# Patient Record
Sex: MALE | Race: WHITE | NOT HISPANIC OR LATINO | Employment: OTHER | URBAN - METROPOLITAN AREA
[De-identification: names, ages, dates, MRNs, and addresses within clinical notes are randomized per-mention and may not be internally consistent; named-entity substitution may affect disease eponyms.]

---

## 2017-05-11 ENCOUNTER — TELEPHONE (OUTPATIENT)
Dept: NEUROLOGY | Facility: MEDICAL CENTER | Age: 57
End: 2017-05-11

## 2017-05-11 NOTE — TELEPHONE ENCOUNTER
Dr. Cano received the pt's blood work and his Triglycerides ar 2111 and the range is 0 - 149. Dr. Cano wants to know if he is taking the Fish oil 1000 mg TID, Tricor and the Pravachol. If he is taking the Fish oil 1000 mg TID he needs to increase it to 2000 mg TID. Called and spoke with pt's wife. All information/instructions were given. She verbally understood and will comply with all given. They will keep the appointment with Dr. Cano tomorrow. LALITA

## 2017-05-12 ENCOUNTER — OFFICE VISIT (OUTPATIENT)
Dept: NEUROLOGY | Facility: MEDICAL CENTER | Age: 57
End: 2017-05-12
Payer: MEDICARE

## 2017-05-12 VITALS
BODY MASS INDEX: 43.26 KG/M2 | SYSTOLIC BLOOD PRESSURE: 132 MMHG | HEIGHT: 71 IN | OXYGEN SATURATION: 93 % | RESPIRATION RATE: 16 BRPM | DIASTOLIC BLOOD PRESSURE: 76 MMHG | WEIGHT: 309 LBS | HEART RATE: 76 BPM | TEMPERATURE: 98.4 F

## 2017-05-12 DIAGNOSIS — E07.9 THYROID DISEASE: ICD-10-CM

## 2017-05-12 DIAGNOSIS — M79.2 POLYNEUROPATHIC PAIN: ICD-10-CM

## 2017-05-12 DIAGNOSIS — E78.2 MIXED HYPERLIPIDEMIA: ICD-10-CM

## 2017-05-12 PROCEDURE — G8419 CALC BMI OUT NRM PARAM NOF/U: HCPCS | Performed by: PSYCHIATRY & NEUROLOGY

## 2017-05-12 PROCEDURE — G8432 DEP SCR NOT DOC, RNG: HCPCS | Performed by: PSYCHIATRY & NEUROLOGY

## 2017-05-12 PROCEDURE — 3017F COLORECTAL CA SCREEN DOC REV: CPT | Mod: 8P | Performed by: PSYCHIATRY & NEUROLOGY

## 2017-05-12 PROCEDURE — 99214 OFFICE O/P EST MOD 30 MIN: CPT | Performed by: PSYCHIATRY & NEUROLOGY

## 2017-05-12 PROCEDURE — 1036F TOBACCO NON-USER: CPT | Performed by: PSYCHIATRY & NEUROLOGY

## 2017-05-12 RX ORDER — DILTIAZEM HYDROCHLORIDE 60 MG/1
TABLET, FILM COATED ORAL
COMMUNITY
Start: 2017-04-19

## 2017-05-12 ASSESSMENT — PATIENT HEALTH QUESTIONNAIRE - PHQ9: CLINICAL INTERPRETATION OF PHQ2 SCORE: 0

## 2017-05-12 NOTE — PROGRESS NOTES
NEUROLOGY NOTE    Referring Physician  Kelly Spence      CHIEF COMPLAINT:  Tingling on the feet and hands for 4 years  Chief Complaint   Patient presents with   • Follow-Up     Polyneuropathic pain       PRESENT ILLNESS:   Tingling on the feet and hands for 4 years    He denied alcohol, he did not have DM, he did not have neuropathy    Older brother -- 7 years older than the patient---also has neuropathy started having neuropathy since age of 20+ yO    Pain and tingling that made him not able to wear any normal shoes    He tried lyrica before but that did not help    He used to be an  before 2010--- he is not able to work since 2010-- possible stroke? then    PAST MEDICAL HISTORY:  Past Medical History   Diagnosis Date   • Hypertension        PAST SURGICAL HISTORY:  Past Surgical History   Procedure Laterality Date   • Other orthopedic surgery       L ring finger       FAMILY HISTORY:  History reviewed. No pertinent family history.    SOCIAL HISTORY:  Social History     Social History   • Marital Status:      Spouse Name: N/A   • Number of Children: N/A   • Years of Education: N/A     Occupational History   • Not on file.     Social History Main Topics   • Smoking status: Former Smoker -- 0.50 packs/day for 38 years   • Smokeless tobacco: Never Used   • Alcohol Use: 0.0 oz/week     0 Standard drinks or equivalent per week      Comment: very rare   • Drug Use: Yes     Special: Inhaled      Comment: marijuana - last time today   • Sexual Activity: Not on file     Other Topics Concern   • Not on file     Social History Narrative     ALLERGIES:  Allergies   Allergen Reactions   • Other Drug      The 'old tetanus' had,  A shot 2 yrs ago of the 'new'tetanus      TOBHX  History   Smoking status   • Former Smoker -- 0.50 packs/day for 38 years   Smokeless tobacco   • Never Used     ALCHX  History   Alcohol Use   • 0.0 oz/week   • 0 Standard drinks or equivalent per week     Comment: very rare      "    DRUGHX  History   Drug Use   • Yes   • Special: Inhaled     Comment: marijuana - last time today           MEDICATIONS:  Current Outpatient Prescriptions   Medication   • SYMBICORT 80-4.5 MCG/ACT Aerosol   • aspirin EC (ECOTRIN) 81 MG Tablet Delayed Response   • clopidogrel (PLAVIX) 75 MG Tab   • lisinopril (PRINIVIL, ZESTRIL) 40 MG tablet   • pravastatin (PRAVACHOL) 40 MG tablet   • amlodipine (NORVASC) 5 MG Tab   • Multiple Vitamin (MULTI VITAMIN DAILY PO)   • Cholecalciferol (VITAMIN D3) 5000 UNITS Tab   • buPROPion (WELLBUTRIN XL) 300 MG XL tablet   • Omega-3 Fatty Acids (FISH OIL) 1200 MG Cap   • furosemide (LASIX) 20 MG Tab   • carvedilol (COREG) 3.125 MG Tab   • fluticasone (FLONASE) 50 MCG/ACT nasal spray   • fenofibrate (TRICOR) 145 MG Tab   • Varenicline Tartrate (CHANTIX PO)     No current facility-administered medications for this visit.       REVIEW OF SYSTEM:    Constitutional: Denies fevers, Denies weight changes   Eyes: Denies changes in vision, no eye pain   Ears/Nose/Throat/Mouth: hearing loss  Cardiovascular: heart disease, CHF,   Respiratory: COPD  Gastrointestinal/Hepatic: Denies abdominal pain, nausea, vomiting, diarrhea, constipation or GI bleeding   Genitourinary: Denies bladder dysfunction, dysuria or frequency   Musculoskeletal/Rheum: Denies joint pain and swelling   Skin/Breast: Denies rash, denies breast lumps or discharge   Neurological: polyneuropathy for 4 years, stroke? In 2010 and not able to function  Psychiatric: denies mood disorder   Endocrine: denies hx of diabetes or thyroid dysfunction   Heme/Oncology/Lymph Nodes: Denies enlarged lymph nodes, denies brusing or known bleeding disorder   Allergic/Immunologic: Denies hx of allergies   Chronic use of Cannabis      PHYSICAL AND NEUROLOGICAL EXMAINATIONS:  VITAL SIGNS: /76 mmHg  Pulse 76  Temp(Src) 36.9 °C (98.4 °F)  Resp 16  Ht 1.803 m (5' 11\")  Wt 140.161 kg (309 lb)  BMI 43.12 kg/m2  SpO2 93%  CURRENT WEIGHT: "   BMI: Body mass index is 43.12 kg/(m^2).  PREVIOUS WEIGHTS:  Wt Readings from Last 25 Encounters:   05/12/17 140.161 kg (309 lb)   08/16/16 133.902 kg (295 lb 3.2 oz)   05/16/16 129.275 kg (285 lb)   05/02/11 102.059 kg (225 lb)       General appearance of patient: WDWN(+) NAD(+)    EYES  o Fundus : Papilledem(-) Exudates(-) Hemorrhage(-)  Nervous System  Orientation to time, place and person(+)  Memory normal(+)  Language: aphasia(-)  Knowledge: past(+) Current(+)  Attention(+)  Cranial Nerves  • Nerve 2: intact  • Nerve 3,4,6: intact  • Nerve 5 : intact  • Nerve 7: intact  • Nerve 8: hearing loss  • Nerve 9 & 10: intact  • Nerve 11: intact  • Nerve 12: intact  Muscle Power and muscle tone: symmetric, normal in upper and lower  Sensory System: Pin sensation intact(+)  Reflexes: symmetric throughout  Cerebellar Function FNP normal   Gait : Steady(-) TandemGait -- not able to perform  Heart and Vascular  Peripheral Vasucular system : Edema (-) Swelling(-)  RHB, Breathing sound clear  abdomen bowel sound normoactive  Extremities freely moveable  Joints no contracture     Baseline---tingling up to the knees  Tingling up the thigh-- L>R   Both hands tingling-- 4 years  Did not fall since last visit  Could walk 2 blocks    Patient is seeing the following doctors    Cardiologist-- heart failure  Pulmonologist-- sleep apnea  PCP  Neurologist-- us        IMPRESSION:    1. Polyneuropathy for 4 years-- small fiber? Pain and tingling ( lyrica tried but failed to help) affecting both feet and hands  2. Chronic use of Cannabis , Hx of Cocaine and Amphetamine in the past  3. Episodes of left body tingling and weakness 2010 and made him unable to function-- the last episode 2012  4. Hx of CAD, CHF and COPD  5. Hypertriglyemia? Off the chart--High Triglyceride could cause neuropathy-- patient promised to give us the labs -- see reference    PLAN/RECOMMENDATIONS:       High Triglyceride could cause neuropathy-- patient promised to  give us the labs -- see reference    ________________________________________________________________________    Advise exercise muscle and exercise brain  Avoid processed foods-- focus on natural foods  Avoid sugar  Weight Loss  Fasting for 12 hours every other day  Quit smoking   ________________________________________________________________________    Fish Oil -- Omega 3 2000mg 3# daily  Try Daily Probiotic too  Vit D-3 2000 unit daily  ________________________________________________________________________    ________________________________________________________________________      ________________________________________________________________________    Foods that inflame    Try to avoid or limit these foods as much as possible:     refined carbohydrates, such as white bread and pastries     French fries and other fried foods     soda and other sugar-sweetened beverages     red meat (burgers, steaks) and processed meat (hot dogs, sausage)     margarine, shortening, and lard    Foods that combat inflammation     Include plenty of these anti-inflammatory foods in your diet:     tomatoes     olive oil     green leafy vegetables, such as spinach, kale, and collards     nuts like almonds and walnuts     fatty fish like salmon, mackerel, tuna, and sardines     fruits such as strawberries, blueberries, cherries, and oranges        ________________________________________________________________________    PLAN        SIGNATURE:  Katie-Linda Cano    ________________________________________________________________________      Hypertriglyceridemia and peripheral neuropathy might be related-- please see the following reference    PREFERENCE whether to add one more medicine to control Hypertriglyceridemia    Combination therapy raises safety concerns. All statins (especially at higher doses) increase the risk of rhabdomyolysis; this risk may be compounded by fibrate use. Cerivastatin (Baycol) was withdrawn from the  market because of reports of fatal rhabdomyolysis, often in patients also taking gem-fibrozil (Lopid). An increased risk also has been shown with rosuvastatin (Crestor).18,19 When combined with statins, gemfibrozil may increase serum statin levels by inhibiting statin metabolism.    Compared with gemfibrozil/statin therapy, fenofibrate/statin therapy has a lower incidence and reported rate of rhabdomyolysis and may be safer.20,21 However, long-term safety and outcome data for fibrate/statin combinations are lacking, and combination therapy should be used with caution. Patients should receive the lowest possible statin dosage, be monitored closely for side effects (e.g., muscle pain, brown urine), and be given the opportunity for proper informed consent.    Neuro Endocrinol Danuta. 2005 Dec;26(6):775-9. Hypertriglyceridemia and peripheral neuropathy in neurologically asymptomatic patients.  Ilir HS1, Dom ST, Amrit MS, Brandin RA.  ________________________________________________________________________        CC:  Kelly Spence M.D.

## 2017-05-12 NOTE — PATIENT INSTRUCTIONS
IMPRESSION:    1. Polyneuropathy for 4 years-- small fiber? Pain and tingling ( lyrica tried but failed to help) affecting both feet and hands  2. Chronic use of Cannabis , Hx of Cocaine and Amphetamine in the past  3. Episodes of left body tingling and weakness 2010 and made him unable to function-- the last episode 2012  4. Hx of CAD, CHF and COPD  5. Hypertriglyemia? Off the chart--High Triglyceride could cause neuropathy-- patient promised to give us the labs -- see reference    PLAN/RECOMMENDATIONS:       High Triglyceride could cause neuropathy-- patient promised to give us the labs -- see reference    ________________________________________________________________________    Advise exercise muscle and exercise brain  Avoid processed foods-- focus on natural foods  Avoid sugar  Weight Loss  Fasting for 12 hours every other day  Quit smoking   ________________________________________________________________________    Fish Oil -- Omega 3 2000mg 3# daily  Try Daily Probiotic too  Vit D-3 2000 unit daily  ________________________________________________________________________    ________________________________________________________________________      ________________________________________________________________________    Foods that inflame    Try to avoid or limit these foods as much as possible:     refined carbohydrates, such as white bread and pastries     French fries and other fried foods     soda and other sugar-sweetened beverages     red meat (burgers, steaks) and processed meat (hot dogs, sausage)     margarine, shortening, and lard    Foods that combat inflammation     Include plenty of these anti-inflammatory foods in your diet:     tomatoes     olive oil     green leafy vegetables, such as spinach, kale, and collards     nuts like almonds and walnuts     fatty fish like salmon, mackerel, tuna, and sardines     fruits such as strawberries, blueberries, cherries, and  oranges        ________________________________________________________________________    PLAN        SIGNATURE:  Lashae Cano    ________________________________________________________________________      Hypertriglyceridemia and peripheral neuropathy might be related-- please see the following reference    PREFERENCE whether to add one more medicine to control Hypertriglyceridemia    Combination therapy raises safety concerns. All statins (especially at higher doses) increase the risk of rhabdomyolysis; this risk may be compounded by fibrate use. Cerivastatin (Baycol) was withdrawn from the market because of reports of fatal rhabdomyolysis, often in patients also taking gem-fibrozil (Lopid). An increased risk also has been shown with rosuvastatin (Crestor).18,19 When combined with statins, gemfibrozil may increase serum statin levels by inhibiting statin metabolism.    Compared with gemfibrozil/statin therapy, fenofibrate/statin therapy has a lower incidence and reported rate of rhabdomyolysis and may be safer.20,21 However, long-term safety and outcome data for fibrate/statin combinations are lacking, and combination therapy should be used with caution. Patients should receive the lowest possible statin dosage, be monitored closely for side effects (e.g., muscle pain, brown urine), and be given the opportunity for proper informed consent.    Neuro Endocrinol Danuta. 2005 Dec;26(6):775-9. Hypertriglyceridemia and peripheral neuropathy in neurologically asymptomatic patients.  Ilir HS1, Dom ST, Amrit MS, Brandin RA.  ________________________________________________________________________        CC:  Kelly Spence M.D.

## 2017-05-12 NOTE — MR AVS SNAPSHOT
"        Nikolas Bang   2017 1:20 PM   Office Visit   MRN: [id number]    Department:  [facility]   Dept Phone:  [phone]    Description:  Male : 1960   Provider:  Lashae Cano M.D.           Reason for Visit     Follow-Up Polyneuropathic pain      Allergies as of 2017     Allergen Noted Reactions    Other Drug 2010       The 'old tetanus' had,  A shot 2 yrs ago of the 'new'tetanus       You were diagnosed with     Polyneuropathic pain   [085787]       Thyroid disease   [550105]       Mixed hyperlipidemia   [272.2.ICD-9-CM]         Vital Signs     Blood Pressure Pulse Temperature Respirations Height Weight    132/76 mmHg 76 36.9 °C (98.4 °F) 16 1.803 m (5' 11\") 140.161 kg (309 lb)    Body Mass Index Oxygen Saturation Smoking Status             43.12 kg/m2 93% Former Smoker         Basic Information     Date Of Birth Sex Race Ethnicity Preferred Language    1960 Male White Non- English      Your appointments     Aug 31, 2017  9:40 AM   Follow Up Visit with Lashae Cano M.D.   [facility] (--)    [address] [place] [address], [address]  [facility] [address] [phone]           You will be receiving a confirmation call a few days before your appointment from our automated call confirmation system.              Problem List              ICD-10-CM Priority Class Noted - Resolved    Polyneuropathic pain M79.2   2016 - Present    Thyroid disease E07.9   2016 - Present    Hyperlipidemia E78.5   2016 - Present      Health Maintenance        Date Due Completion Dates    IMM DTaP/Tdap/Td Vaccine (1 - Tdap) 1980 ---    COLONOSCOPY 2011 ---            Current Immunizations     No immunizations on file.      Below and/or attached are the medications your provider expects you to take. Review all of your home medications and newly ordered medications with your provider and/or pharmacist. Follow medication instructions as directed by your provider " and/or pharmacist. Please keep your medication list with you and share with your provider. Update the information when medications are discontinued, doses are changed, or new medications (including over-the-counter products) are added; and carry medication information at all times in the event of emergency situations     Allergies:  OTHER DRUG - (reactions not documented)               Medications  Valid as of: May 12, 2017 -  1:35 PM    Generic Name Brand Name Tablet Size Instructions for use    AmLODIPine Besylate (Tab) NORVASC 5 MG Take 5 mg by mouth every day.        Aspirin (Tablet Delayed Response) ECOTRIN 81 MG Take 81 mg by mouth every day.        Budesonide-Formoterol Fumarate (Aerosol) SYMBICORT 80-4.5 MCG/ACT         BuPROPion HCl (TABLET SR 24 HR) WELLBUTRIN  MG Take 300 mg by mouth every morning.        Carvedilol (Tab) COREG 3.125 MG Take 3.125 mg by mouth 2 times a day, with meals.        Cholecalciferol (Tab) Vitamin D3 5000 UNITS Take  by mouth.        Clopidogrel Bisulfate (Tab) PLAVIX 75 MG Take 75 mg by mouth every day.        Fenofibrate (Tab) TRICOR 145 MG Take 145 mg by mouth every day.        Fluticasone Propionate (Suspension) FLONASE 50 MCG/ACT Spray 1 Spray in nose every day.        Furosemide (Tab) LASIX 20 MG Take 20 mg by mouth 2 times a day.        Lisinopril (Tab) PRINIVIL, ZESTRIL 40 MG Take 40 mg by mouth every day.        Multiple Vitamin   Take  by mouth.        Omega-3 Fatty Acids (Cap) Fish Oil 1200 MG Take  by mouth.        Pravastatin Sodium (Tab) PRAVACHOL 40 MG Take 40 mg by mouth every evening.        Varenicline Tartrate   Take  by mouth.        .                 Medicines prescribed today were sent to:     [facility] [address] [place], [place] [address] [place] [address]    [address] [address]    Phone: [phone] Fax: [phone]    Open 24 Hours?: No      Medication refill instructions:       If your prescription bottle indicates you  have medication refills left, it is not necessary to call your provider’s office. Please contact your pharmacy and they will refill your medication.    If your prescription bottle indicates you do not have any refills left, you may request refills at any time through one of the following ways: The online [employer] system (except Urgent Care), by calling your provider’s office, or by asking your pharmacy to contact your provider’s office with a refill request. Medication refills are processed only during regular business hours and may not be available until the next business day. Your provider may request additional information or to have a follow-up visit with you prior to refilling your medication.   *Please Note: Medication refills are assigned a new Rx number when refilled electronically. Your pharmacy may indicate that no refills were authorized even though a new prescription for the same medication is available at the pharmacy. Please request the medicine by name with the pharmacy before contacting your provider for a refill.        Referral     A referral request has been sent to our patient care coordination department. Please allow 3-5 business days for us to process this request and contact you either by phone or mail. If you do not hear from us by the 5th business day, please call us at [phone].        Instructions        IMPRESSION:    1. Polyneuropathy for 4 years-- small fiber? Pain and tingling ( lyrica tried but failed to help) affecting both feet and hands  2. Chronic use of Cannabis , Hx of Cocaine and Amphetamine in the past  3. Episodes of left body tingling and weakness 2010 and made him unable to function-- the last episode 2012  4. Hx of CAD, CHF and COPD  5. Hypertriglyemia? Off the chart--High Triglyceride could cause neuropathy-- patient promised to give us the labs -- see reference    PLAN/RECOMMENDATIONS:       High Triglyceride could cause neuropathy-- patient promised to give us the  labs -- see reference    ________________________________________________________________________    Advise exercise muscle and exercise brain  Avoid processed foods-- focus on natural foods  Avoid sugar  Weight Loss  Fasting for 12 hours every other day  Quit smoking   ________________________________________________________________________    Fish Oil -- Omega 3 2000mg 3# daily  Try Daily Probiotic too  Vit D-3 2000 unit daily  ________________________________________________________________________    ________________________________________________________________________      ________________________________________________________________________    Foods that inflame    Try to avoid or limit these foods as much as possible:     refined carbohydrates, such as white bread and pastries     French fries and other fried foods     soda and other sugar-sweetened beverages     red meat (burgers, steaks) and processed meat (hot dogs, sausage)     margarine, shortening, and lard    Foods that combat inflammation     Include plenty of these anti-inflammatory foods in your diet:     tomatoes     olive oil     green leafy vegetables, such as spinach, kale, and collards     nuts like almonds and walnuts     fatty fish like salmon, mackerel, tuna, and sardines     fruits such as strawberries, blueberries, cherries, and oranges        ________________________________________________________________________    PLAN        SIGNATURE:  Katie-Linda Cano    ________________________________________________________________________      Hypertriglyceridemia and peripheral neuropathy might be related-- please see the following reference    PREFERENCE whether to add one more medicine to control Hypertriglyceridemia    Combination therapy raises safety concerns. All statins (especially at higher doses) increase the risk of rhabdomyolysis; this risk may be compounded by fibrate use. Cerivastatin (Baycol) was withdrawn from the market because  of reports of fatal rhabdomyolysis, often in patients also taking gem-fibrozil (Lopid). An increased risk also has been shown with rosuvastatin (Crestor).18,19 When combined with statins, gemfibrozil may increase serum statin levels by inhibiting statin metabolism.    Compared with gemfibrozil/statin therapy, fenofibrate/statin therapy has a lower incidence and reported rate of rhabdomyolysis and may be safer.20,21 However, long-term safety and outcome data for fibrate/statin combinations are lacking, and combination therapy should be used with caution. Patients should receive the lowest possible statin dosage, be monitored closely for side effects (e.g., muscle pain, brown urine), and be given the opportunity for proper informed consent.    Neuro Endocrinol Danuta. 2005 Dec;26(6):775-9. Hypertriglyceridemia and peripheral neuropathy in neurologically asymptomatic patients.  Ilir HS1, Dom ST, Amrit MS, Brandin RA.  ________________________________________________________________________        CC:  Kelly Spence M.D.              [employer] Access Code: Activation code not generated  Current [employer] Status: Active

## 2017-05-15 ENCOUNTER — TELEPHONE (OUTPATIENT)
Dept: NEUROLOGY | Facility: MEDICAL CENTER | Age: 57
End: 2017-05-15

## 2017-05-15 NOTE — TELEPHONE ENCOUNTER
Pt's wife is calling and has questions about the blood work, Called and LM for pt's wife with the answers she needed. KA

## 2017-06-20 LAB
CHOLEST SERPL-MCNC: 427 MG/DL (ref 100–199)
COMMENT 011824: ABNORMAL
HDLC SERPL-MCNC: 18 MG/DL
LDLC SERPL CALC-MCNC: ABNORMAL MG/DL (ref 0–99)
TRIGL SERPL-MCNC: 2507 MG/DL (ref 0–149)
VLDLC SERPL CALC-MCNC: ABNORMAL MG/DL (ref 5–40)

## 2017-06-30 ENCOUNTER — TELEPHONE (OUTPATIENT)
Dept: NEUROLOGY | Facility: MEDICAL CENTER | Age: 57
End: 2017-06-30

## 2017-06-30 NOTE — TELEPHONE ENCOUNTER
----- Message from Lashae Cano M.D. sent at 6/20/2017  2:39 PM PDT -----  With high triglyceride    Does the patient take omega 3( fish oil)  If not  Remind him      ________________________________________________________________________    Fish Oil -- Omega 3 1000mg 3# two times per day  ________________________________________________________________________             ----- Message -----     From: Keith Metzger     Sent: 6/20/2017  10:06 AM       To: Lashae Cano M.D.

## 2017-08-31 ENCOUNTER — APPOINTMENT (OUTPATIENT)
Dept: NEUROLOGY | Facility: MEDICAL CENTER | Age: 57
End: 2017-08-31
Payer: MEDICARE

## 2017-09-29 ENCOUNTER — OFFICE VISIT (OUTPATIENT)
Dept: NEUROLOGY | Facility: MEDICAL CENTER | Age: 57
End: 2017-09-29
Payer: MEDICARE

## 2017-09-29 VITALS
WEIGHT: 308 LBS | DIASTOLIC BLOOD PRESSURE: 80 MMHG | RESPIRATION RATE: 16 BRPM | HEIGHT: 71 IN | OXYGEN SATURATION: 93 % | BODY MASS INDEX: 43.12 KG/M2 | TEMPERATURE: 98.3 F | HEART RATE: 76 BPM | SYSTOLIC BLOOD PRESSURE: 132 MMHG

## 2017-09-29 DIAGNOSIS — M79.2 POLYNEUROPATHIC PAIN: ICD-10-CM

## 2017-09-29 DIAGNOSIS — E07.9 THYROID DISEASE: ICD-10-CM

## 2017-09-29 DIAGNOSIS — E78.2 MIXED HYPERLIPIDEMIA: ICD-10-CM

## 2017-09-29 PROCEDURE — 99214 OFFICE O/P EST MOD 30 MIN: CPT | Performed by: PSYCHIATRY & NEUROLOGY

## 2017-09-29 RX ORDER — CYCLOBENZAPRINE HCL 10 MG
TABLET ORAL
COMMUNITY
Start: 2017-09-21

## 2017-09-29 RX ORDER — PREDNISONE 10 MG/1
TABLET ORAL
COMMUNITY
Start: 2017-09-21 | End: 2018-01-23

## 2017-09-29 RX ORDER — AMOXICILLIN 500 MG
3600 CAPSULE ORAL 2 TIMES DAILY
Qty: 180 CAP | Refills: 4 | Status: SHIPPED | OUTPATIENT
Start: 2017-09-29

## 2017-09-29 RX ORDER — HYDROCODONE BITARTRATE AND ACETAMINOPHEN 10; 325 MG/1; MG/1
TABLET ORAL
COMMUNITY
Start: 2017-09-21

## 2017-09-29 NOTE — PROGRESS NOTES
NEUROLOGY NOTE    Referring Physician  Kelly Spence      CHIEF COMPLAINT:  Tingling on the feet and hands for 4 years  Chief Complaint   Patient presents with   • Follow-Up     Polyneuropathic pain       PRESENT ILLNESS:   Tingling on the feet and hands for 4 years    He denied alcohol, he did not have DM, he did not have neuropathy    Older brother -- 7 years older than the patient---also has neuropathy started having neuropathy since age of 20+ yO    Pain and tingling that made him not able to wear any normal shoes    He tried lyrica before but that did not help    He used to be an  before 2010--- he is not able to work since 2010-- possible stroke? then    PAST MEDICAL HISTORY:  Past Medical History:   Diagnosis Date   • Hypertension        PAST SURGICAL HISTORY:  Past Surgical History:   Procedure Laterality Date   • OTHER ORTHOPEDIC SURGERY      L ring finger       FAMILY HISTORY:  No family history on file.    SOCIAL HISTORY:  Social History     Social History   • Marital status:      Spouse name: N/A   • Number of children: N/A   • Years of education: N/A     Occupational History   • Not on file.     Social History Main Topics   • Smoking status: Former Smoker     Packs/day: 0.50     Years: 38.00   • Smokeless tobacco: Never Used   • Alcohol use 0.0 oz/week      Comment: very rare   • Drug use:      Types: Inhaled, Marijuana      Comment: marijuana - last time today   • Sexual activity: Not on file     Other Topics Concern   • Not on file     Social History Narrative   • No narrative on file     ALLERGIES:  Allergies   Allergen Reactions   • Other Drug      The 'old tetanus' had,  A shot 2 yrs ago of the 'new'tetanus      TOBHX  History   Smoking Status   • Former Smoker   • Packs/day: 0.50   • Years: 38.00   Smokeless Tobacco   • Never Used     ALCHX  History   Alcohol Use   • 0.0 oz/week     Comment: very rare     DRUGHX  History   Drug Use   • Types: Inhaled, Marijuana     Comment:  "marijuana - last time today           MEDICATIONS:  Current Outpatient Prescriptions   Medication   • cyclobenzaprine (FLEXERIL) 10 MG Tab   • predniSONE (DELTASONE) 10 MG Tab   • SYMBICORT 80-4.5 MCG/ACT Aerosol   • aspirin EC (ECOTRIN) 81 MG Tablet Delayed Response   • clopidogrel (PLAVIX) 75 MG Tab   • lisinopril (PRINIVIL, ZESTRIL) 40 MG tablet   • pravastatin (PRAVACHOL) 40 MG tablet   • amlodipine (NORVASC) 5 MG Tab   • Multiple Vitamin (MULTI VITAMIN DAILY PO)   • Cholecalciferol (VITAMIN D3) 5000 UNITS Tab   • buPROPion (WELLBUTRIN XL) 300 MG XL tablet   • fluticasone (FLONASE) 50 MCG/ACT nasal spray   • Omega-3 Fatty Acids (FISH OIL) 1200 MG Cap   • carvedilol (COREG) 3.125 MG Tab   • hydrocodone/acetaminophen (NORCO)  MG Tab   • fenofibrate (TRICOR) 145 MG Tab   • Varenicline Tartrate (CHANTIX PO)   • furosemide (LASIX) 20 MG Tab     No current facility-administered medications for this visit.        REVIEW OF SYSTEM:    Constitutional: Denies fevers, Denies weight changes   Eyes: Denies changes in vision, no eye pain   Ears/Nose/Throat/Mouth: hearing loss  Cardiovascular: heart disease, CHF,   Respiratory: COPD  Gastrointestinal/Hepatic: Denies abdominal pain, nausea, vomiting, diarrhea, constipation or GI bleeding   Genitourinary: Denies bladder dysfunction, dysuria or frequency   Musculoskeletal/Rheum: Denies joint pain and swelling   Skin/Breast: Denies rash, denies breast lumps or discharge   Neurological: polyneuropathy for 4 years, stroke? In 2010 and not able to function  Psychiatric: denies mood disorder   Endocrine: denies hx of diabetes or thyroid dysfunction   Heme/Oncology/Lymph Nodes: Denies enlarged lymph nodes, denies brusing or known bleeding disorder   Allergic/Immunologic: Denies hx of allergies   Chronic use of Cannabis      PHYSICAL AND NEUROLOGICAL EXMAINATIONS:  VITAL SIGNS: /80   Pulse 76   Temp 36.8 °C (98.3 °F)   Resp 16   Ht 1.803 m (5' 11\")   Wt (!) 139.7 kg " (308 lb)   SpO2 93%   BMI 42.96 kg/m²   CURRENT WEIGHT:   BMI: Body mass index is 42.96 kg/m².  PREVIOUS WEIGHTS:  Wt Readings from Last 25 Encounters:   09/29/17 (!) 139.7 kg (308 lb)   05/12/17 (!) 140.2 kg (309 lb)   08/16/16 (!) 133.9 kg (295 lb 3.2 oz)   05/16/16 (!) 129.3 kg (285 lb)   05/02/11 102.1 kg (225 lb)       General appearance of patient: WDWN(+) NAD(+)    EYES  o Fundus : Papilledem(-) Exudates(-) Hemorrhage(-)  Nervous System  Orientation to time, place and person(+)  Memory normal(+)  Language: aphasia(-)  Knowledge: past(+) Current(+)  Attention(+)  Cranial Nerves  • Nerve 2: intact  • Nerve 3,4,6: intact  • Nerve 5 : intact  • Nerve 7: intact  • Nerve 8: hearing loss  • Nerve 9 & 10: intact  • Nerve 11: intact  • Nerve 12: intact  Muscle Power and muscle tone: symmetric, normal in upper and lower  Sensory System: Pin sensation intact(+)  Reflexes: symmetric throughout  Cerebellar Function FNP normal   Gait : Steady(-) TandemGait -- not able to perform  Heart and Vascular  Peripheral Vasucular system : Edema (-) Swelling(-)  RHB, Breathing sound clear  abdomen bowel sound normoactive  Extremities freely moveable  Joints no contracture     Baseline---tingling up to the knees  Tingling up the thigh-- L>R   Both hands tingling-- 4 years  Did not fall since last visit  Could walk 2 blocks    Patient is seeing the following doctors    Cardiologist-- heart failure  Pulmonologist-- sleep apnea  PCP  Neurologist-- us    Younger brother -- 6 years younger --got TIA   Older sister --   Old brother-- polyneuropathy-- high TG    IMPRESSION:    1. Polyneuropathy for 4 years-- small fiber? Pain and tingling ( lyrica tried but failed to help) affecting both feet and hands  2. Chronic use of Cannabis , Hx of Cocaine and Amphetamine in the past  3. Episodes of left body tingling and weakness 2010 and made him unable to function-- the last episode 2012  4. Hx of CAD, CHF and COPD  5. Familiar Hypertriglyemia?  Off the chart--High Triglyceride could cause neuropathy-- patient promised to give us the labs -- see reference    PLAN/RECOMMENDATIONS:           ________________________________________________________________________    Advise exercise muscle and exercise brain  Avoid processed foods-- focus on natural foods  Avoid sugar  Weight Loss  Fasting for 12 hours every other day  Quit smoking   ________________________________________________________________________    Fish Oil -- Omega 3 2000mg 3# daily ( the patient probably is only taking 900mg bid?  Try Daily Probiotic too  Vit D-3 4000 unit daily  ________________________________________________________________________    ________________________________________________________________________    Results for DOAN JESSE CARRIZALES (MRN [id number]) as of 9/29/2017 10:14   Ref. Range 5/9/2017 12:52 6/19/2017 08:49   Cholesterol,Tot Latest Ref Range: 100 - 199 mg/dL 434 (H) 427 (H)   Triglycerides Latest Ref Range: 0 - 149 mg/dL 2,111 (HH) 2,507 (HH)   HDL Latest Ref Range: >39 mg/dL 22 (L) 18 (L)     ________________________________________________________________________    Foods that inflame    Try to avoid or limit these foods as much as possible:     refined carbohydrates, such as white bread and pastries     French fries and other fried foods     soda and other sugar-sweetened beverages     red meat (burgers, steaks) and processed meat (hot dogs, sausage)     margarine, shortening, and lard    Foods that combat inflammation     Include plenty of these anti-inflammatory foods in your diet:     tomatoes     olive oil     green leafy vegetables, such as spinach, kale, and collards     nuts like almonds and walnuts     fatty fish like salmon, mackerel, tuna, and sardines     fruits such as strawberries, blueberries, cherries, and oranges        ________________________________________________________________________    PLAN        SIGNATURE:  Lashae  Jerome    ________________________________________________________________________      Hypertriglyceridemia and peripheral neuropathy might be related-- please see the following reference    PREFERENCE whether to add one more medicine to control Hypertriglyceridemia    Combination therapy raises safety concerns. All statins (especially at higher doses) increase the risk of rhabdomyolysis; this risk may be compounded by fibrate use. Cerivastatin (Baycol) was withdrawn from the market because of reports of fatal rhabdomyolysis, often in patients also taking gem-fibrozil (Lopid). An increased risk also has been shown with rosuvastatin (Crestor).18,19 When combined with statins, gemfibrozil may increase serum statin levels by inhibiting statin metabolism.    Compared with gemfibrozil/statin therapy, fenofibrate/statin therapy has a lower incidence and reported rate of rhabdomyolysis and may be safer.20,21 However, long-term safety and outcome data for fibrate/statin combinations are lacking, and combination therapy should be used with caution. Patients should receive the lowest possible statin dosage, be monitored closely for side effects (e.g., muscle pain, brown urine), and be given the opportunity for proper informed consent.    Neuro Endocrinol Dan[place]. 2005 Dec;26(6):775-9. Hypertriglyceridemia and peripheral neuropathy in neurologically asymptomatic patients.  Ilir HS1, Dom ST, Amrit MS, Brandin RA.  ________________________________________________________________________        CC:  Kelly Spence M.D.

## 2017-10-02 ENCOUNTER — TELEPHONE (OUTPATIENT)
Dept: NEUROLOGY | Facility: MEDICAL CENTER | Age: 57
End: 2017-10-02

## 2017-10-02 NOTE — TELEPHONE ENCOUNTER
Pharmacy called to change Fish Oil -- Omega 3 2000mg 3# daily ( the patient probably is only taking 900mg bid?    Insurance won't pay for 2000 #3 daily. They will pay for 1000 mg tabs to equal 6000 mg generic, they will give that amount to equal prescribed amount.

## 2017-10-09 ENCOUNTER — TELEPHONE (OUTPATIENT)
Dept: NEUROLOGY | Facility: MEDICAL CENTER | Age: 57
End: 2017-10-09

## 2017-10-19 ENCOUNTER — TELEPHONE (OUTPATIENT)
Dept: NEUROLOGY | Facility: MEDICAL CENTER | Age: 57
End: 2017-10-19

## 2017-10-19 NOTE — TELEPHONE ENCOUNTER
Pharmacy called insurance will only approve 4 caps a day bid of Lovaza 1 gm caps. Script changed patient notified

## 2017-10-25 ENCOUNTER — TELEPHONE (OUTPATIENT)
Dept: NEUROLOGY | Facility: MEDICAL CENTER | Age: 57
End: 2017-10-25

## 2018-01-19 ENCOUNTER — TELEPHONE (OUTPATIENT)
Dept: NEUROLOGY | Facility: MEDICAL CENTER | Age: 58
End: 2018-01-19

## 2018-01-19 LAB
CHOLEST SERPL-MCNC: 354 MG/DL (ref 100–199)
COMMENT 011824: ABNORMAL
HDLC SERPL-MCNC: 15 MG/DL
LDLC SERPL CALC-MCNC: ABNORMAL MG/DL (ref 0–99)
TRIGL SERPL-MCNC: 1948 MG/DL (ref 0–149)
VLDLC SERPL CALC-MCNC: ABNORMAL MG/DL (ref 5–40)

## 2018-01-20 NOTE — TELEPHONE ENCOUNTER
Results for JESSE DOAN (MRN [id number]) as of 1/19/2018 16:09   Ref. Range 5/9/2017 12:52 6/19/2017 08:49 1/18/2018 07:42   Cholesterol,Tot Latest Ref Range: 100 - 199 mg/dL 434 (H) 427 (H) 354 (H)   Triglycerides Latest Ref Range: 0 - 149 mg/dL 2,111 (HH) 2,507 (HH) 1,948 (HH)   HDL Latest Ref Range: >39 mg/dL 22 (L) 18 (L) 15 (L)     Please remind him to try  Fish Oil -- Omega 3 2000mg 3# daily

## 2018-01-23 ENCOUNTER — OFFICE VISIT (OUTPATIENT)
Dept: NEUROLOGY | Facility: MEDICAL CENTER | Age: 58
End: 2018-01-23
Payer: MEDICARE

## 2018-01-23 VITALS
TEMPERATURE: 98.7 F | RESPIRATION RATE: 18 BRPM | HEIGHT: 71 IN | OXYGEN SATURATION: 94 % | WEIGHT: 287.7 LBS | BODY MASS INDEX: 40.28 KG/M2 | SYSTOLIC BLOOD PRESSURE: 128 MMHG | DIASTOLIC BLOOD PRESSURE: 70 MMHG | HEART RATE: 68 BPM

## 2018-01-23 DIAGNOSIS — I63.549 CEREBROVASCULAR ACCIDENT (CVA) DUE TO STENOSIS OF CEREBELLAR ARTERY, UNSPECIFIED BLOOD VESSEL LATERALITY (HCC): ICD-10-CM

## 2018-01-23 DIAGNOSIS — M79.2 POLYNEUROPATHIC PAIN: ICD-10-CM

## 2018-01-23 DIAGNOSIS — G40.909 SEIZURE CEREBRAL (HCC): ICD-10-CM

## 2018-01-23 PROBLEM — I63.9 STROKE (HCC): Status: ACTIVE | Noted: 2018-01-23

## 2018-01-23 PROCEDURE — 99214 OFFICE O/P EST MOD 30 MIN: CPT | Performed by: PSYCHIATRY & NEUROLOGY

## 2018-01-23 NOTE — PATIENT INSTRUCTIONS
Younger brother -- 6 years younger --got TIA   Older sister --   Old brother-- polyneuropathy-- high TG    IMPRESSION:    1. Polyneuropathy for 4 years-- small fiber? Pain and tingling ( lyrica tried but failed to help) affecting both feet and hands--- continue to worsen  2. Chronic use of Cannabis , Hx of Cocaine and Amphetamine in the past  3. Episodes of left body tingling, shaking and weakness 2010 and made him unable to function-- the last episode 2012  4. Hx of CAD status post stent, CHF and COPD  5. Familiar Hypertriglyemia? Off the chart--High Triglyceride could cause neuropathy-- patient promised to give us the labs -- see reference  6. Episodes of left body tingling, shaking and weakness worsened since last visit-- small stroke or seizure    PLAN/RECOMMENDATIONS:     Seeing cardiologist  On the way of losing weight, denied alcohol, smoking, did use weeds  Will get MRI of brain, Carotid Duplex, EEG      ________________________________________________________________________    Advise exercise muscle and exercise brain  Avoid processed foods-- focus on natural foods  Avoid sugar  Weight Loss  Fasting for 12 hours every other day  Quit smoking   ________________________________________________________________________    Fish Oil -- Omega 3 3000mg 3# daily-- total 9000mg omega 3 daily ( the patient probably is only taking around 2000mg ?  Try Daily Probiotic too  Vit D-3 4000 unit daily  ________________________________________________________________________        ________________________________________________________________________    Foods that inflame    Try to avoid or limit these foods as much as possible:     refined carbohydrates, such as white bread and pastries     French fries and other fried foods     soda and other sugar-sweetened beverages     red meat (burgers, steaks) and processed meat (hot dogs, sausage)     margarine, shortening, and lard    Foods that combat inflammation     Include  plenty of these anti-inflammatory foods in your diet:     tomatoes     olive oil     green leafy vegetables, such as spinach, kale, and collards     nuts like almonds and walnuts     fatty fish like salmon, mackerel, tuna, and sardines     fruits such as strawberries, blueberries, cherries, and oranges        ________________________________________________________________________    PLAN        SIGNATURE:  Lashae Jerome    ________________________________________________________________________      Hypertriglyceridemia and peripheral neuropathy might be related-- please see the following reference    PREFERENCE whether to add one more medicine to control Hypertriglyceridemia    Combination therapy raises safety concerns. All statins (especially at higher doses) increase the risk of rhabdomyolysis; this risk may be compounded by fibrate use. Cerivastatin (Baycol) was withdrawn from the market because of reports of fatal rhabdomyolysis, often in patients also taking gem-fibrozil (Lopid). An increased risk also has been shown with rosuvastatin (Crestor).18,19 When combined with statins, gemfibrozil may increase serum statin levels by inhibiting statin metabolism.    Compared with gemfibrozil/statin therapy, fenofibrate/statin therapy has a lower incidence and reported rate of rhabdomyolysis and may be safer.20,21 However, long-term safety and outcome data for fibrate/statin combinations are lacking, and combination therapy should be used with caution. Patients should receive the lowest possible statin dosage, be monitored closely for side effects (e.g., muscle pain, brown urine), and be given the opportunity for proper informed consent.    Neuro Endocrinol Danuta. 2005 Dec;26(6):775-9. Hypertriglyceridemia and peripheral neuropathy in neurologically asymptomatic patients.  Ilir HS1, Dom ST, Amrit MS, Sawaya RA.  ________________________________________________________________________

## 2018-01-23 NOTE — PROGRESS NOTES
NEUROLOGY NOTE    Referring Physician  Kelly Spence      CHIEF COMPLAINT:  Tingling on the feet and hands for 4 years  Chief Complaint   Patient presents with   • Follow-Up     Polyneuropathic pain       PRESENT ILLNESS:   Tingling on the feet and hands for 4 years    He denied alcohol, he did not have DM, he did not have neuropathy    Older brother -- 7 years older than the patient---also has neuropathy started having neuropathy since age of 20+ yO    Pain and tingling that made him not able to wear any normal shoes    He tried lyrica before but that did not help    He used to be an  before 2010--- he is not able to work since 2010-- possible stroke? Then    Episodes of left body tingling, shaking and weakness worsened since last visit-- small stroke or seizure    PAST MEDICAL HISTORY:  Past Medical History:   asdfasdfads Date   • Hypertension        PAST SURGICAL HISTORY:  Past Surgical History:   Procedure Laterality Date   • OTHER ORTHOPEDIC SURGERY      L ring finger       FAMILY HISTORY:  No family history on file.    SOCIAL HISTORY:  Social History     Social History   • Marital status:      Spouse name: N/A   • Number of children: N/A   • Years of education: N/A     Occupational History   • Not on file.     Social History Main Topics   • Smoking status: Former Smoker     Packs/day: 0.50     Years: 38.00   • Smokeless tobacco: Never Used   • Alcohol use 0.0 oz/week      Comment: very rare   • Drug use:      Types: Inhaled, Marijuana      Comment: marijuana - last time today   • Sexual activity: Not on file     Other Topics Concern   • Not on file     Social History Narrative   • No narrative on file     ALLERGIES:  Allergies   Allergen Reactions   • Other Drug      The 'old tetanus' had,  A shot 2 yrs ago of the 'new'tetanus      TOBHX  History   Smoking Status   • Former Smoker   • Packs/day: 0.50   • Years: 38.00   Smokeless Tobacco   • Never Used     ALCHX  History   Alcohol Use   •  0.0 oz/week     Comment: very rare     DRUGHX  History   Drug Use   • Types: Inhaled, Marijuana     Comment: marijuana - last time today           MEDICATIONS:  Current Outpatient Prescriptions   Medication   • Omega-3 Fatty Acids (FISH OIL) 1200 MG Cap   • SYMBICORT 80-4.5 MCG/ACT Aerosol   • aspirin EC (ECOTRIN) 81 MG Tablet Delayed Response   • clopidogrel (PLAVIX) 75 MG Tab   • lisinopril (PRINIVIL, ZESTRIL) 40 MG tablet   • pravastatin (PRAVACHOL) 40 MG tablet   • amlodipine (NORVASC) 5 MG Tab   • Multiple Vitamin (MULTI VITAMIN DAILY PO)   • Cholecalciferol (VITAMIN D3) 5000 UNITS Tab   • fenofibrate (TRICOR) 145 MG Tab   • furosemide (LASIX) 20 MG Tab   • carvedilol (COREG) 3.125 MG Tab   • cyclobenzaprine (FLEXERIL) 10 MG Tab   • predniSONE (DELTASONE) 10 MG Tab   • hydrocodone/acetaminophen (NORCO)  MG Tab   • buPROPion (WELLBUTRIN XL) 300 MG XL tablet   • fluticasone (FLONASE) 50 MCG/ACT nasal spray   • Varenicline Tartrate (CHANTIX PO)     No current facility-administered medications for this visit.        REVIEW OF SYSTEM:    Constitutional: Denies fevers, Denies weight changes   Eyes: Denies changes in vision, no eye pain   Ears/Nose/Throat/Mouth: hearing loss  Cardiovascular: heart disease, CHF,   Respiratory: COPD  Gastrointestinal/Hepatic: Denies abdominal pain, nausea, vomiting, diarrhea, constipation or GI bleeding   Genitourinary: Denies bladder dysfunction, dysuria or frequency   Musculoskeletal/Rheum: Denies joint pain and swelling   Skin/Breast: Denies rash, denies breast lumps or discharge   Neurological: polyneuropathy for 4 years, stroke? In 2010 and not able to function  Psychiatric: denies mood disorder   Endocrine: denies hx of diabetes or thyroid dysfunction   Heme/Oncology/Lymph Nodes: Denies enlarged lymph nodes, denies brusing or known bleeding disorder   Allergic/Immunologic: Denies hx of allergies   Chronic use of Cannabis      PHYSICAL AND NEUROLOGICAL EXMAINATIONS:  VITAL  "SIGNS: /70   Pulse 68   Temp 37.1 °C (98.7 °F)   Resp 18   Ht 1.803 m (5' 11\")   Wt (!) 130.5 kg (287 lb 11.2 oz)   SpO2 94%   BMI 40.13 kg/m²   CURRENT WEIGHT:   BMI: Body mass index is 40.13 kg/m².  PREVIOUS WEIGHTS:  Wt Readings from Last 25 Encounters:   01/23/18 (!) 130.5 kg (287 lb 11.2 oz)   09/29/17 (!) 139.7 kg (308 lb)   05/12/17 (!) 140.2 kg (309 lb)   08/16/16 (!) 133.9 kg (295 lb 3.2 oz)   05/16/16 (!) 129.3 kg (285 lb)   05/02/11 102.1 kg (225 lb)       General appearance of patient: WDWN(+) NAD(+)    EYES  o Fundus : Papilledem(-) Exudates(-) Hemorrhage(-)  Nervous System  Orientation to time, place and person(+)  Memory normal(+)  Language: aphasia(-)  Knowledge: past(+) Current(+)  Attention(+)  Cranial Nerves  • Nerve 2: intact  • Nerve 3,4,6: intact  • Nerve 5 : intact  • Nerve 7: intact  • Nerve 8: hearing loss  • Nerve 9 & 10: intact  • Nerve 11: intact  • Nerve 12: intact  Muscle Power and muscle tone: symmetric, normal in upper and lower  Sensory System: Pin sensation intact(+)  Reflexes: symmetric throughout  Cerebellar Function FNP normal   Gait : Steady(-) TandemGait -- not able to perform  Heart and Vascular  Peripheral Vasucular system : Edema (-) Swelling(-)  RHB, Breathing sound clear  abdomen bowel sound normoactive  Extremities freely moveable  Joints no contracture     Baseline---tingling up to the knees  Tingling up the thigh-- L>R   Both hands tingling-- 4 years  Did not fall since last visit  Could walk 2 blocks    Patient is seeing the following doctors    Cardiologist-- heart failure  Pulmonologist-- sleep apnea  PCP  Neurologist-- us    Younger brother -- 6 years younger --got TIA   Older sister --   Old brother-- polyneuropathy-- high TG    IMPRESSION:    1. Polyneuropathy for 4 years-- small fiber? Pain and tingling ( lyrica tried but failed to help) affecting both feet and hands--- continue to worsen  2. Chronic use of Cannabis , Hx of Cocaine and Amphetamine in " the past  3. Episodes of left body tingling, shaking and weakness 2010 and made him unable to function-- the last episode 2012  4. Hx of CAD status post stent, CHF and COPD  5. Familiar Hypertriglyemia? Off the chart--High Triglyceride could cause neuropathy-- patient promised to give us the labs -- see reference  6. Episodes of left body tingling, shaking and weakness worsened since last visit-- small stroke or seizure    PLAN/RECOMMENDATIONS:     Seeing cardiologist  On the way of losing weight, denied alcohol, smoking, did use weeds  Will get MRI of brain, Carotid Duplex, EEG      ________________________________________________________________________    Advise exercise muscle and exercise brain  Avoid processed foods-- focus on natural foods  Avoid sugar  Weight Loss  Fasting for 12 hours every other day  Quit smoking   ________________________________________________________________________    Fish Oil -- Omega 3 3000mg 3# daily-- total 9000mg omega 3 daily ( the patient probably is only taking around 2000mg ?  Try Daily Probiotic too  Vit D-3 4000 unit daily  ________________________________________________________________________        ________________________________________________________________________    Foods that inflame    Try to avoid or limit these foods as much as possible:     refined carbohydrates, such as white bread and pastries     French fries and other fried foods     soda and other sugar-sweetened beverages     red meat (burgers, steaks) and processed meat (hot dogs, sausage)     margarine, shortening, and lard    Foods that combat inflammation     Include plenty of these anti-inflammatory foods in your diet:     tomatoes     olive oil     green leafy vegetables, such as spinach, kale, and collards     nuts like almonds and walnuts     fatty fish like salmon, mackerel, tuna, and sardines     fruits such as strawberries, blueberries, cherries, and  oranges        ________________________________________________________________________    PLAN        SIGNATURE:  Lashae Cano    ________________________________________________________________________      Hypertriglyceridemia and peripheral neuropathy might be related-- please see the following reference    PREFERENCE whether to add one more medicine to control Hypertriglyceridemia    Combination therapy raises safety concerns. All statins (especially at higher doses) increase the risk of rhabdomyolysis; this risk may be compounded by fibrate use. Cerivastatin (Baycol) was withdrawn from the market because of reports of fatal rhabdomyolysis, often in patients also taking gem-fibrozil (Lopid). An increased risk also has been shown with rosuvastatin (Crestor).18,19 When combined with statins, gemfibrozil may increase serum statin levels by inhibiting statin metabolism.    Compared with gemfibrozil/statin therapy, fenofibrate/statin therapy has a lower incidence and reported rate of rhabdomyolysis and may be safer.20,21 However, long-term safety and outcome data for fibrate/statin combinations are lacking, and combination therapy should be used with caution. Patients should receive the lowest possible statin dosage, be monitored closely for side effects (e.g., muscle pain, brown urine), and be given the opportunity for proper informed consent.    Neuro Endocrinol Danuta. 2005 Dec;26(6):775-9. Hypertriglyceridemia and peripheral neuropathy in neurologically asymptomatic patients.  Ilir HS1, Dom ST, Amrit MS, Brandin RA.  ________________________________________________________________________        CC:  Kelly Spence M.D.

## 2018-01-25 ENCOUNTER — HOSPITAL ENCOUNTER (OUTPATIENT)
Dept: RADIOLOGY | Facility: MEDICAL CENTER | Age: 58
End: 2018-01-25
Attending: PSYCHIATRY & NEUROLOGY
Payer: MEDICARE

## 2018-01-25 DIAGNOSIS — G40.909 SEIZURE CEREBRAL (HCC): ICD-10-CM

## 2018-01-25 DIAGNOSIS — M79.2 POLYNEUROPATHIC PAIN: ICD-10-CM

## 2018-01-25 DIAGNOSIS — I63.549 CEREBROVASCULAR ACCIDENT (CVA) DUE TO STENOSIS OF CEREBELLAR ARTERY, UNSPECIFIED BLOOD VESSEL LATERALITY (HCC): ICD-10-CM

## 2018-01-25 PROCEDURE — 70551 MRI BRAIN STEM W/O DYE: CPT

## 2018-01-26 ENCOUNTER — HOSPITAL ENCOUNTER (OUTPATIENT)
Dept: RADIOLOGY | Facility: MEDICAL CENTER | Age: 58
End: 2018-01-26
Attending: PSYCHIATRY & NEUROLOGY
Payer: MEDICARE

## 2018-01-26 DIAGNOSIS — G40.909 SEIZURE CEREBRAL (HCC): ICD-10-CM

## 2018-01-26 DIAGNOSIS — M79.2 POLYNEUROPATHIC PAIN: ICD-10-CM

## 2018-01-26 DIAGNOSIS — I63.549 CEREBROVASCULAR ACCIDENT (CVA) DUE TO STENOSIS OF CEREBELLAR ARTERY, UNSPECIFIED BLOOD VESSEL LATERALITY (HCC): ICD-10-CM

## 2018-01-26 PROCEDURE — 93880 EXTRACRANIAL BILAT STUDY: CPT

## 2018-01-26 PROCEDURE — 93880 EXTRACRANIAL BILAT STUDY: CPT | Mod: 26 | Performed by: SURGERY

## 2018-01-31 ENCOUNTER — TELEPHONE (OUTPATIENT)
Dept: NEUROLOGY | Facility: MEDICAL CENTER | Age: 58
End: 2018-01-31

## 2018-01-31 NOTE — TELEPHONE ENCOUNTER
Wife called would like MRI results, [facility] and would like to know what [facility] means.    Please advise    Thank you

## 2018-02-01 NOTE — TELEPHONE ENCOUNTER
Lashae Cano M.D.  [place], [facility]      Good news for him     No acute stroke or big old stroke   Some nonspecific findings-- observation is fine     Carotid arteries duplex-- the major arteries are not remarkable      Spoke to patient gave above results per Dr Cano. He will call with any concerns.

## 2018-05-17 ENCOUNTER — NON-PROVIDER VISIT (OUTPATIENT)
Dept: NEUROLOGY | Facility: MEDICAL CENTER | Age: 58
End: 2018-05-17
Payer: MEDICARE

## 2018-05-17 DIAGNOSIS — M79.2 POLYNEUROPATHIC PAIN: ICD-10-CM

## 2018-05-17 DIAGNOSIS — G40.909 SEIZURE CEREBRAL (HCC): ICD-10-CM

## 2018-05-17 DIAGNOSIS — I63.549 CEREBROVASCULAR ACCIDENT (CVA) DUE TO STENOSIS OF CEREBELLAR ARTERY, UNSPECIFIED BLOOD VESSEL LATERALITY (HCC): ICD-10-CM

## 2018-05-17 PROCEDURE — 95819 EEG AWAKE AND ASLEEP: CPT | Performed by: PSYCHIATRY & NEUROLOGY

## 2018-05-21 NOTE — PROGRESS NOTES
ROUTINE ELECTROENCEPHALOGRAM REPORT      NAME: Nikolas Bang    REFERRING Dr:    INDICATION: 6. Episodes of left body tingling, shaking and weakness worsened since last visit-- small stroke or seizure      TECHNIQUE: 30 channel routine electroencephalogram (EEG) was performed in accordance with the international 10-20 system. The study was reviewed in bipolar and referential montages. The recording examined the patient during wakeful and drowsy state(s).     DESCRIPTION OF THE RECORD:        Background rhythm during awake stage shows well-organized, well-developed, average voltage 10 to 11 hertz alpha activity in the posterior regions.  It blocks with eye opening and it is bilaterally synchronous and symmetrical.  No spike-and-wave discharges or any lateralizing abnormalities are seen.  Photic stimulation did not produce any abnormalities. No abnormalities were found during the procedure. Stage I sleep was achieved.      ACTIVATION PROCEDURES:      Photic Stimulation were done    ICTAL AND/OR INTERICTAL FINDINGS:    No focal or generalized epileptiform activity noted. No regional slowing was seen during this routine study.  No clinical events or seizures were reported or recorded during the study.      EKG: sampling of the EKG recording demonstrated irregular sinus? rhythm        INTERPRETATION:        ________________________________________________________________________    This scalp EEG remains not remarkable    Of note, unremarkable EEG does not completely exclude the diagnosis  of seizures since seizure is an episodic phenomena and frontal lobe seizures could have normal scalp EEG. Clinical correlation may help     If clinical suspicion of seizure remains high.  Prolonged outpatient EEG   monitoring may be of help.      ________________________________________________________________________

## 2018-05-21 NOTE — PROCEDURES
DATE OF SERVICE:  05/17/2018    This is an outpatient EEG, done on 05/17/2018.    ROUTINE ELECTROENCEPHALOGRAM REPORT        NAME: BangNikolas Daryl     REFERRING Dr:     INDICATION: 6. Episodes of left body tingling, shaking and weakness worsened since last visit-- small stroke or seizure        TECHNIQUE: 30 channel routine electroencephalogram (EEG) was performed in accordance with the international 10-20 system. The study was reviewed in bipolar and referential montages. The recording examined the patient during wakeful and drowsy state(s).      DESCRIPTION OF THE RECORD:           Background rhythm during awake stage shows well-organized, well-developed, average voltage 10 to 11 hertz alpha activity in the posterior regions.  It blocks with eye opening and it is bilaterally synchronous and symmetrical.  No spike-and-wave discharges or any lateralizing abnormalities are seen.  Photic stimulation did not produce any abnormalities. No abnormalities were found during the procedure. Stage I sleep was achieved.        ACTIVATION PROCEDURES:       Photic Stimulation were done     ICTAL AND/OR INTERICTAL FINDINGS:    No focal or generalized epileptiform activity noted. No regional slowing was seen during this routine study.  No clinical events or seizures were reported or recorded during the study.       EKG: sampling of the EKG recording demonstrated irregular sinus? rhythm          INTERPRETATION:           ________________________________________________________________________     This scalp EEG remains not remarkable     Of note, unremarkable EEG does not completely exclude the diagnosis  of seizures since seizure is an episodic phenomena and frontal lobe seizures could have normal scalp EEG. Clinical correlation may help     If clinical suspicion of seizure remains high.  Prolonged outpatient EEG   monitoring may be of  help.        ________________________________________________________________________                      ____________________________________     MD CLAUDIA HO    DD:  05/21/2018 07:51:03  DT:  05/21/2018 07:56:25    D#:  [id number]  Job#:  [id number]

## 2018-06-14 ENCOUNTER — OFFICE VISIT (OUTPATIENT)
Dept: NEUROLOGY | Facility: MEDICAL CENTER | Age: 58
End: 2018-06-14
Payer: MEDICARE

## 2018-06-14 VITALS
SYSTOLIC BLOOD PRESSURE: 120 MMHG | HEIGHT: 71 IN | OXYGEN SATURATION: 94 % | DIASTOLIC BLOOD PRESSURE: 76 MMHG | RESPIRATION RATE: 18 BRPM | WEIGHT: 270.4 LBS | HEART RATE: 117 BPM | BODY MASS INDEX: 37.85 KG/M2

## 2018-06-14 DIAGNOSIS — G62.9 POLYNEUROPATHY: ICD-10-CM

## 2018-06-14 DIAGNOSIS — E78.1 HIGH TRIGLYCERIDES: ICD-10-CM

## 2018-06-14 DIAGNOSIS — M79.2 POLYNEUROPATHIC PAIN: ICD-10-CM

## 2018-06-14 DIAGNOSIS — E56.9 VITAMIN DEFICIENCY: ICD-10-CM

## 2018-06-14 DIAGNOSIS — R25.2 CRAMP AND SPASM: ICD-10-CM

## 2018-06-14 DIAGNOSIS — G40.909 SEIZURE CEREBRAL (HCC): ICD-10-CM

## 2018-06-14 PROBLEM — I63.9 STROKE (HCC): Status: RESOLVED | Noted: 2018-01-23 | Resolved: 2018-06-14

## 2018-06-14 PROCEDURE — 99214 OFFICE O/P EST MOD 30 MIN: CPT | Performed by: PSYCHIATRY & NEUROLOGY

## 2018-06-14 RX ORDER — LEVETIRACETAM 250 MG/1
250 TABLET ORAL 2 TIMES DAILY
Qty: 60 TAB | Refills: 3 | Status: SHIPPED | OUTPATIENT
Start: 2018-06-14 | End: 2018-07-30 | Stop reason: SINTOL

## 2018-06-14 RX ORDER — ROSUVASTATIN CALCIUM 20 MG/1
20 TABLET, COATED ORAL EVERY EVENING
COMMUNITY

## 2018-06-14 ASSESSMENT — PATIENT HEALTH QUESTIONNAIRE - PHQ9: CLINICAL INTERPRETATION OF PHQ2 SCORE: 0

## 2018-06-14 NOTE — PROGRESS NOTES
NEUROLOGY NOTE    Referring Physician  Kelly Spence      CHIEF COMPLAINT:  Tingling on the feet and hands for 4 years  Chief Complaint   Patient presents with   • Follow-Up     3 month follow up       PRESENT ILLNESS:   Tingling on the feet and hands for 4 years    He denied alcohol, he did not have DM, he did not have neuropathy    Older brother -- 7 years older than the patient---also has neuropathy started having neuropathy since age of 20+ yO    Pain and tingling that made him not able to wear any normal shoes    He tried lyrica before but that did not help    He used to be an  before 2010--- he is not able to work since 2010-- possible stroke? Then    Episodes of left body tingling, shaking and weakness worsened since last visit-- small stroke or seizure    PAST MEDICAL HISTORY:  Past Medical History:   Diagnosis Date   • Hypertension        PAST SURGICAL HISTORY:  Past Surgical History:   Procedure Laterality Date   • OTHER ORTHOPEDIC SURGERY      L ring finger       FAMILY HISTORY:  No family history on file.    SOCIAL HISTORY:  Social History     Social History   • Marital status:      Spouse name: N/A   • Number of children: N/A   • Years of education: N/A     Occupational History   • Not on file.     Social History Main Topics   • Smoking status: Former Smoker     Packs/day: 0.50     Years: 38.00   • Smokeless tobacco: Never Used   • Alcohol use 0.0 oz/week      Comment: very rare   • Drug use: Yes     Types: Inhaled, Marijuana      Comment: marijuana - last time today   • Sexual activity: Not on file     Other Topics Concern   • Not on file     Social History Narrative   • No narrative on file     ALLERGIES:  Allergies   Allergen Reactions   • Other Drug      The 'old tetanus' had,  A shot 2 yrs ago of the 'new'tetanus      TOBHX  History   Smoking Status   • Former Smoker   • Packs/day: 0.50   • Years: 38.00   Smokeless Tobacco   • Never Used     ALCHX  History   Alcohol Use   •  0.0 oz/week     Comment: very rare     DRUGHX  History   Drug Use   • Types: Inhaled, Marijuana     Comment: marijuana - last time today           MEDICATIONS:  Current Outpatient Prescriptions   Medication   • rosuvastatin (CRESTOR) 20 MG Tab   • cyclobenzaprine (FLEXERIL) 10 MG Tab   • Omega-3 Fatty Acids (FISH OIL) 1200 MG Cap   • SYMBICORT 80-4.5 MCG/ACT Aerosol   • aspirin EC (ECOTRIN) 81 MG Tablet Delayed Response   • lisinopril (PRINIVIL, ZESTRIL) 40 MG tablet   • amlodipine (NORVASC) 5 MG Tab   • Multiple Vitamin (MULTI VITAMIN DAILY PO)   • Cholecalciferol (VITAMIN D3) 5000 UNITS Tab   • fluticasone (FLONASE) 50 MCG/ACT nasal spray   • fenofibrate (TRICOR) 145 MG Tab   • furosemide (LASIX) 20 MG Tab   • carvedilol (COREG) 3.125 MG Tab   • hydrocodone/acetaminophen (NORCO)  MG Tab   • clopidogrel (PLAVIX) 75 MG Tab   • pravastatin (PRAVACHOL) 40 MG tablet   • buPROPion (WELLBUTRIN XL) 300 MG XL tablet     No current facility-administered medications for this visit.        REVIEW OF SYSTEM:    Constitutional: Denies fevers, Denies weight changes   Eyes: Denies changes in vision, no eye pain   Ears/Nose/Throat/Mouth: hearing loss  Cardiovascular: heart disease, CHF,   Respiratory: COPD  Gastrointestinal/Hepatic: Denies abdominal pain, nausea, vomiting, diarrhea, constipation or GI bleeding   Genitourinary: Denies bladder dysfunction, dysuria or frequency   Musculoskeletal/Rheum: Denies joint pain and swelling   Skin/Breast: Denies rash, denies breast lumps or discharge   Neurological: polyneuropathy for 4 years, stroke? In 2010 and not able to function  Psychiatric: denies mood disorder   Endocrine: denies hx of diabetes or thyroid dysfunction   Heme/Oncology/Lymph Nodes: Denies enlarged lymph nodes, denies brusing or known bleeding disorder   Allergic/Immunologic: Denies hx of allergies   Chronic use of Cannabis      PHYSICAL AND NEUROLOGICAL EXMAINATIONS:  VITAL SIGNS: /76   Pulse (!) 117    "Resp 18   Ht 1.803 m (5' 11\")   Wt 122.7 kg (270 lb 6.4 oz)   SpO2 94%   BMI 37.71 kg/m²   CURRENT WEIGHT:   BMI: Body mass index is 37.71 kg/m².  PREVIOUS WEIGHTS:  Wt Readings from Last 25 Encounters:   06/14/18 122.7 kg (270 lb 6.4 oz)   01/23/18 (!) 130.5 kg (287 lb 11.2 oz)   09/29/17 (!) 139.7 kg (308 lb)   05/12/17 (!) 140.2 kg (309 lb)   08/16/16 (!) 133.9 kg (295 lb 3.2 oz)   05/16/16 (!) 129.3 kg (285 lb)   05/02/11 102.1 kg (225 lb)       General appearance of patient: WDWN(+) NAD(+)    EYES  o Fundus : Papilledem(-) Exudates(-) Hemorrhage(-)  Nervous System  Orientation to time, place and person(+)  Memory normal(+)  Language: aphasia(-)  Knowledge: past(+) Current(+)  Attention(+)  Cranial Nerves  • Nerve 2: intact  • Nerve 3,4,6: intact  • Nerve 5 : intact  • Nerve 7: intact  • Nerve 8: hearing loss  • Nerve 9 & 10: intact  • Nerve 11: intact  • Nerve 12: intact  Muscle Power and muscle tone: symmetric, normal in upper and lower  Sensory System: Pin sensation intact(+)  Reflexes: symmetric throughout  Cerebellar Function FNP normal   Gait : Steady(-) TandemGait -- not able to perform  Heart and Vascular  Peripheral Vasucular system : Edema (-) Swelling(-)  RHB, Breathing sound clear  abdomen bowel sound normoactive  Extremities freely moveable  Joints no contracture     Baseline---tingling up to the knees  Tingling up the thigh-- L>R   Both hands tingling-- 4 years  Did not fall since last visit  Could walk 2 blocks    Patient is seeing the following doctors    Cardiologist-- heart failure  Pulmonologist-- sleep apnea  PCP  Neurologist--     Younger brother -- 6 years younger --got TIA   Older sister --   Old brother-- polyneuropathy-- high TG    IMPRESSION:    1. Polyneuropathy for 4 years-- small fiber? Pain and tingling ( lyrica tried but failed to help) affecting both feet and hands--- continue to worsen  2. Chronic use of Cannabis , Hx of Cocaine and Amphetamine in the past  3. Episodes of " left body tingling, shaking and weakness 2010 and made him unable to function-- the last episode 2012  4. Hx of CAD status post stent, CHF and COPD  5. Familiar Hypertriglyemia? Off the chart--High Triglyceride could cause neuropathy-- patient promised to give us the labs -- see reference  6. Episodes of left body tingling, shaking and weakness worsened since last visit-- small stroke or seizure    PLAN/RECOMMENDATIONS:     Seeing cardiologist  On the way of losing weight, denied alcohol, smoking, did use weeds  MRI of brain, Carotid Duplex, EEG are not remarkable      ________________________________________________________________________    Advise exercise muscle and exercise brain  Avoid processed foods-- focus on natural foods  Avoid sugar  Weight Loss  Fasting for 12 hours every other day  Quit smoking     ________________________________________________________________________    Fish Oil -- Omega 3 3000mg 3# daily-- total 9000mg omega 3 daily- currently taking 4000mg daily    We will add Keppra 250mg two times per day to address the left hand tingling and weakness ( we are treating the spells of left limb dysfunction as subtle seizures or migraine variants)  If any side effects, stop, please call us if not helpful      After blood tests  Add the following    Thiamine( Vitamin B1 ) 500mg daily  Niacinamide ( Vitamin B3) 500mg daily       Daily Probiotic too  Vit D-3 4000 unit daily  ________________________________________________________________________        ________________________________________________________________________    Foods that inflame    Try to avoid or limit these foods as much as possible:     refined carbohydrates, such as white bread and pastries     French fries and other fried foods     soda and other sugar-sweetened beverages     red meat (burgers, steaks) and processed meat (hot dogs, sausage)     margarine, shortening, and lard    Foods that combat inflammation     Include plenty  of these anti-inflammatory foods in your diet:     tomatoes     olive oil     green leafy vegetables, such as spinach, kale, and collards     nuts like almonds and walnuts     fatty fish like salmon, mackerel, tuna, and sardines     fruits such as strawberries, blueberries, cherries, and oranges        ________________________________________________________________________    PLAN        SIGNATURE:  Lashae Jerome    ________________________________________________________________________      Hypertriglyceridemia and peripheral neuropathy might be related-- please see the following reference    PREFERENCE whether to add one more medicine to control Hypertriglyceridemia    Combination therapy raises safety concerns. All statins (especially at higher doses) increase the risk of rhabdomyolysis; this risk may be compounded by fibrate use. Cerivastatin (Baycol) was withdrawn from the market because of reports of fatal rhabdomyolysis, often in patients also taking gem-fibrozil (Lopid). An increased risk also has been shown with rosuvastatin (Crestor).18,19 When combined with statins, gemfibrozil may increase serum statin levels by inhibiting statin metabolism.    Compared with gemfibrozil/statin therapy, fenofibrate/statin therapy has a lower incidence and reported rate of rhabdomyolysis and may be safer.20,21 However, long-term safety and outcome data for fibrate/statin combinations are lacking, and combination therapy should be used with caution. Patients should receive the lowest possible statin dosage, be monitored closely for side effects (e.g., muscle pain, brown urine), and be given the opportunity for proper informed consent.    Neuro Endocrinol Danuta. 2005 Dec;26(6):775-9. Hypertriglyceridemia and peripheral neuropathy in neurologically asymptomatic patients.  Ilir HS1, Dom ST, Amrit MS, Brandin RA.  ________________________________________________________________________        CC:  Kelly Spence  M.D.     Plaque of the bifurcation extending into the internal carotid. Velocities    are consistent with < 50% stenosis of the internal carotid artery.    Plaque is smooth on the surface and homogeneous with low acoustic density.    Marked elevation of the subclavian artery velocity. Waveforms are triphasic    in nature.      Left carotid:   Plaque of the bifurcation extending into the internal carotid. Velocities    are consistent with < 50% stenosis of the internal carotid artery.    Plaque is irregular on the surface and heterogeneous with mixed acoustic    densities.     ________________________________________________________________________    This scalp EEG remains not remarkable    Of note, unremarkable EEG does not completely exclude the diagnosis  of seizures since seizure is an episodic phenomena and frontal lobe seizures could have normal scalp EEG. Clinical correlation may help     If clinical suspicion of seizure remains high.  Prolonged outpatient EEG   monitoring may be of help.      ________________________________________________________________________      MRI of brain-- no strokes

## 2018-06-14 NOTE — PATIENT INSTRUCTIONS
IMPRESSION:    1. Polyneuropathy for 4 years-- small fiber? Pain and tingling ( lyrica tried but failed to help) affecting both feet and hands--- continue to worsen  2. Chronic use of Cannabis , Hx of Cocaine and Amphetamine in the past  3. Episodes of left body tingling, shaking and weakness 2010 and made him unable to function-- the last episode 2012  4. Hx of CAD status post stent, CHF and COPD  5. Familiar Hypertriglyemia? Off the chart--High Triglyceride could cause neuropathy-- patient promised to give us the labs -- see reference  6. Episodes of left body tingling, shaking and weakness worsened since last visit-- small stroke or seizure    PLAN/RECOMMENDATIONS:     Seeing cardiologist  On the way of losing weight, denied alcohol, smoking, did use weeds  MRI of brain, Carotid Duplex, EEG are not remarkable      ________________________________________________________________________    Advise exercise muscle and exercise brain  Avoid processed foods-- focus on natural foods  Avoid sugar  Weight Loss  Fasting for 12 hours every other day  Quit smoking     ________________________________________________________________________    Fish Oil -- Omega 3 3000mg 3# daily-- total 9000mg omega 3 daily- currently taking 4000mg daily    We will add Keppra 250mg two times per day to address the left hand tingling and weakness ( we are treating the spells of left limb dysfunction as subtle seizures or migraine variants)  If any side effects, stop, please call us if not helpful      After blood tests  Add the following    Thiamine( Vitamin B1 ) 500mg daily  Niacinamide ( Vitamin B3) 500mg daily       Daily Probiotic too  Vit D-3 4000 unit daily  ________________________________________________________________________

## 2018-07-03 ENCOUNTER — TELEPHONE (OUTPATIENT)
Dept: NEUROLOGY | Facility: MEDICAL CENTER | Age: 58
End: 2018-07-03

## 2018-07-03 LAB
25(OH)D3+25(OH)D2 SERPL-MCNC: 35.4 NG/ML (ref 30–100)
CHOLEST SERPL-MCNC: 160 MG/DL (ref 100–199)
COPPER SERPL-MCNC: 105 UG/DL (ref 72–166)
HDLC SERPL-MCNC: 26 MG/DL
LABORATORY COMMENT REPORT: ABNORMAL
LACTATE SERPL-MCNC: 12.8 MG/DL (ref 4.8–25.7)
LDLC SERPL CALC-MCNC: ABNORMAL MG/DL (ref 0–99)
MAGNESIUM SERPL-MCNC: 1.9 MG/DL (ref 1.6–2.3)
TRIGL SERPL-MCNC: 550 MG/DL (ref 0–149)
VIT B1 BLD-SCNC: 189.6 NMOL/L (ref 66.5–200)
VIT B12 SERPL-MCNC: 1294 PG/ML (ref 232–1245)
VIT B6 SERPL-MCNC: 27.8 UG/L (ref 5.3–46.7)
VLDLC SERPL CALC-MCNC: ABNORMAL MG/DL (ref 5–40)

## 2018-07-03 NOTE — TELEPHONE ENCOUNTER
Results for JESSE DOAN (MRN [id number]) as of 7/3/2018 16:24   Ref. Range 1/18/2018 07:42 1/25/2018 17:28 1/26/2018 10:09 5/21/2018 07:51 6/28/2018 08:37   Cholesterol,Tot Latest Ref Range: 100 - 199 mg/dL 354 (H)    160   Triglycerides Latest Ref Range: 0 - 149 mg/dL 1,948 (HH)    550 (H)   HDL Latest Ref Range: >39 mg/dL 15 (L)    26 (L)       Please tell the patient that   Triglycerides improved   Please continue fish oil and monitor neuropathy

## 2018-07-30 ENCOUNTER — TELEPHONE (OUTPATIENT)
Dept: NEUROLOGY | Facility: MEDICAL CENTER | Age: 58
End: 2018-07-30

## 2018-07-30 DIAGNOSIS — G40.109 LOCALIZATION-RELATED EPILEPSY (HCC): ICD-10-CM

## 2018-07-30 RX ORDER — LAMOTRIGINE 25 MG/1
TABLET ORAL
Qty: 120 TAB | Refills: 11 | Status: SHIPPED | OUTPATIENT
Start: 2018-07-30 | End: 2018-10-18

## 2018-07-30 NOTE — TELEPHONE ENCOUNTER
Patient's wife called stating Keppra 250 mg bid is causing patient to be very angry all of the time. Told patient to stop Keppra. Patient does have  Hx of  seizures.  Can he try another medication?      Please advise    Thank you

## 2018-07-30 NOTE — TELEPHONE ENCOUNTER
Yes, we can do a trial of Lamictal. I will place RX at pharmacy. Please warn them avoid rare but possible rash.   Start Lamictal 25 mg po bid for one week, then increase to 50 mg po bid.   Ok to stop Keppra once pt starts on Lamictal.   RA     Spoke to patient gave above information per Dr Armstrong. Patient will call with any concerns.

## 2018-10-18 ENCOUNTER — OFFICE VISIT (OUTPATIENT)
Dept: NEUROLOGY | Facility: MEDICAL CENTER | Age: 58
End: 2018-10-18
Payer: MEDICARE

## 2018-10-18 VITALS
RESPIRATION RATE: 16 BRPM | SYSTOLIC BLOOD PRESSURE: 128 MMHG | TEMPERATURE: 98.2 F | OXYGEN SATURATION: 94 % | HEART RATE: 70 BPM | HEIGHT: 71 IN | BODY MASS INDEX: 39.2 KG/M2 | WEIGHT: 280 LBS | DIASTOLIC BLOOD PRESSURE: 70 MMHG

## 2018-10-18 DIAGNOSIS — E78.01 FAMILIAL HYPERCHOLESTEROLEMIA: ICD-10-CM

## 2018-10-18 PROCEDURE — 99214 OFFICE O/P EST MOD 30 MIN: CPT | Performed by: PSYCHIATRY & NEUROLOGY

## 2018-10-18 NOTE — PROGRESS NOTES
NEUROLOGY NOTE    Referring Physician  Kelly Spence      CHIEF COMPLAINT:  Progressive Tingling on the feet and hands for 4 years  Since 2017, we increased the fish oil, the tingling numbness at least stablized  Chief Complaint   Patient presents with   • Follow-Up     Seizure disorder       PRESENT ILLNESS:   Progressive Tingling on the feet and hands for 4 years  Since 2017, we increased the fish oil, the tingling numbness at least stablized    Just quit fish oil for one month for not able to get the same manufacturor 9/2018    He denied alcohol, he did not have DM, he did not have neuropathy    Older brother -- 7 years older than the patient---also has neuropathy started having neuropathy since age of 20+ yO    Pain and tingling that made him not able to wear any normal shoes    He tried lyrica before but that did not help    He used to be an  before 2010--- he is not able to work since 2010-- possible stroke? Then    Episodes of left body tingling, shaking and weakness worsened since last visit-- small stroke or seizure    PAST MEDICAL HISTORY:  Past Medical History:   Diagnosis Date   • Hypertension        PAST SURGICAL HISTORY:  Past Surgical History:   Procedure Laterality Date   • OTHER ORTHOPEDIC SURGERY      L ring finger       FAMILY HISTORY:  History reviewed. No pertinent family history.    SOCIAL HISTORY:  Social History     Social History   • Marital status:      Spouse name: N/A   • Number of children: N/A   • Years of education: N/A     Occupational History   • Not on file.     Social History Main Topics   • Smoking status: Former Smoker     Packs/day: 0.50     Years: 38.00   • Smokeless tobacco: Never Used   • Alcohol use 0.0 oz/week      Comment: very rare   • Drug use: Yes     Types: Inhaled, Marijuana      Comment: marijuana - last time today   • Sexual activity: Not on file     Other Topics Concern   • Not on file     Social History Narrative   • No narrative on file          ALLERGIES:  Allergies   Allergen Reactions   • Other Drug      The 'old tetanus' had,  A shot 2 yrs ago of the 'new'tetanus      TOBHX  History   Smoking Status   • Former Smoker   • Packs/day: 0.50   • Years: 38.00   Smokeless Tobacco   • Never Used     ALCHX  History   Alcohol Use   • 0.0 oz/week     Comment: very rare     DRUGHX  History   Drug Use   • Types: Inhaled, Marijuana     Comment: marijuana - last time today           MEDICATIONS:  Current Outpatient Prescriptions   Medication   • rosuvastatin (CRESTOR) 20 MG Tab   • cyclobenzaprine (FLEXERIL) 10 MG Tab   • Omega-3 Fatty Acids (FISH OIL) 1200 MG Cap   • SYMBICORT 80-4.5 MCG/ACT Aerosol   • aspirin EC (ECOTRIN) 81 MG Tablet Delayed Response   • lisinopril (PRINIVIL, ZESTRIL) 40 MG tablet   • amlodipine (NORVASC) 5 MG Tab   • Multiple Vitamin (MULTI VITAMIN DAILY PO)   • Cholecalciferol (VITAMIN D3) 5000 UNITS Tab   • fluticasone (FLONASE) 50 MCG/ACT nasal spray   • fenofibrate (TRICOR) 145 MG Tab   • carvedilol (COREG) 3.125 MG Tab   • lamoTRIgine (LAMICTAL) 25 MG Tab   • hydrocodone/acetaminophen (NORCO)  MG Tab   • clopidogrel (PLAVIX) 75 MG Tab   • pravastatin (PRAVACHOL) 40 MG tablet   • buPROPion (WELLBUTRIN XL) 300 MG XL tablet   • furosemide (LASIX) 20 MG Tab     No current facility-administered medications for this visit.        REVIEW OF SYSTEM:    Constitutional: Denies fevers, Denies weight changes   Eyes: Denies changes in vision, no eye pain   Ears/Nose/Throat/Mouth: hearing loss  Cardiovascular: heart disease, CHF,   Respiratory: COPD  Gastrointestinal/Hepatic: Denies abdominal pain, nausea, vomiting, diarrhea, constipation or GI bleeding   Genitourinary: Denies bladder dysfunction, dysuria or frequency   Musculoskeletal/Rheum: Denies joint pain and swelling   Skin/Breast: Denies rash, denies breast lumps or discharge   Neurological: polyneuropathy for 4 years, stroke? In 2010 and not able to function  Psychiatric: denies mood  disorder   Endocrine: denies hx of diabetes or thyroid dysfunction   Heme/Oncology/Lymph Nodes: Denies enlarged lymph nodes, denies brusing or known bleeding disorder   Allergic/Immunologic: Denies hx of allergies   Chronic use of Cannabis      PHYSICAL AND NEUROLOGICAL EXMAINATIONS:  VITAL SIGNS: There were no vitals taken for this visit.  CURRENT WEIGHT:   BMI: There is no height or weight on file to calculate BMI.  PREVIOUS WEIGHTS:  Wt Readings from Last 25 Encounters:   06/14/18 122.7 kg (270 lb 6.4 oz)   01/23/18 (!) 130.5 kg (287 lb 11.2 oz)   09/29/17 (!) 139.7 kg (308 lb)   05/12/17 (!) 140.2 kg (309 lb)   08/16/16 (!) 133.9 kg (295 lb 3.2 oz)   05/16/16 (!) 129.3 kg (285 lb)   05/02/11 102.1 kg (225 lb)       General appearance of patient: WDWN(+) NAD(+)    EYES  o Fundus : Papilledem(-) Exudates(-) Hemorrhage(-)  Nervous System  Orientation to time, place and person(+)  Memory normal(+)  Language: aphasia(-)  Knowledge: past(+) Current(+)  Attention(+)  Cranial Nerves  • Nerve 2: intact  • Nerve 3,4,6: intact  • Nerve 5 : intact  • Nerve 7: intact  • Nerve 8: hearing loss  • Nerve 9 & 10: intact  • Nerve 11: intact  • Nerve 12: intact  Muscle Power and muscle tone: symmetric, normal in upper and lower  Sensory System: Pin sensation intact(+)  Reflexes: symmetric throughout  Cerebellar Function FNP normal   Gait : Steady(-) TandemGait -- not able to perform  Heart and Vascular  Peripheral Vasucular system : Edema (-) Swelling(-)  RHB, Breathing sound clear  abdomen bowel sound normoactive  Extremities freely moveable  Joints no contracture     Baseline---tingling up to the knees  Tingling up the thigh-- L>R   Both hands tingling-- 4 years  Did not fall since last visit  Could walk 2 blocks    Patient is seeing the following doctors    Cardiologist-- heart failure  Pulmonologist-- sleep apnea  PCP  Neurologist-- us    Younger brother -- 6 years younger --got TIA   Older sister --   Old brother--  polyneuropathy-- high TG  Keppra made him mean  He has not tried lamotrigine yet    Tingling below upper calf in both feet 10/18/2018    One big pop behind neck-- spontaneous dural upture    IMPRESSION:    1. Polyneuropathy since 2013-- small fiber? Pain and tingling ( lyrica tried but failed to help) affecting both feet and hands--- continue to worsen  2. Chronic use of Cannabis , Hx of Cocaine and Amphetamine in the past  3. Episodes of left body tingling, shaking and weakness 2010 and made him unable to function-- the last episode 2012-- now in remission  4. Hx of CAD status post stent, CHF and COPD  5. Familiar Hypertriglyemia? Off the chart--High Triglyceride could cause neuropathy-- patient promised to give us the labs -- see reference  6. Episodes of left body tingling, shaking and weakness worsened since last visit-- small stroke or seizure    PLAN/RECOMMENDATIONS:     Seeing cardiologist  On the way of losing weight, denied alcohol, smoking, did use weeds  MRI of brain, Carotid Duplex, EEG are not remarkable      ________________________________________________________________________    Advise exercise muscle and exercise brain  Avoid processed foods-- focus on natural foods  Avoid sugar  Weight Loss  Fasting for 12 hours every other day  Quit smoking     ________________________________________________________________________    Fish Oil -- Omega 3 3000mg 3# daily-- total 9000mg omega 3 daily- currently taking 4000mg daily--- just off fish oil for one month    We could check the TG level every three month for the patient  ______________________________________________________      SIGNATURE:  Lashae Cano    ________________________________________________________________________      Hypertriglyceridemia and peripheral neuropathy might be related-- please see the following reference    PREFERENCE whether to add one more medicine to control Hypertriglyceridemia    Combination therapy raises safety  concerns. All statins (especially at higher doses) increase the risk of rhabdomyolysis; this risk may be compounded by fibrate use. Cerivastatin (Baycol) was withdrawn from the market because of reports of fatal rhabdomyolysis, often in patients also taking gem-fibrozil (Lopid). An increased risk also has been shown with rosuvastatin (Crestor).18,19 When combined with statins, gemfibrozil may increase serum statin levels by inhibiting statin metabolism.    Compared with gemfibrozil/statin therapy, fenofibrate/statin therapy has a lower incidence and reported rate of rhabdomyolysis and may be safer.20,21 However, long-term safety and outcome data for fibrate/statin combinations are lacking, and combination therapy should be used with caution. Patients should receive the lowest possible statin dosage, be monitored closely for side effects (e.g., muscle pain, brown urine), and be given the opportunity for proper informed consent.    Neuro Endocrinol [place]. 2005 Dec;26(6):775-9. Hypertriglyceridemia and peripheral neuropathy in neurologically asymptomatic patients.  Ilir HS1, Dom ST, Amrit MS, Brandin RA.  ________________________________________________________________________        CC:  Kelly Spence M.D.     Plaque of the bifurcation extending into the internal carotid. Velocities    are consistent with < 50% stenosis of the internal carotid artery.    Plaque is smooth on the surface and homogeneous with low acoustic density.    Marked elevation of the subclavian artery velocity. Waveforms are triphasic    in nature.      Left carotid:   Plaque of the bifurcation extending into the internal carotid. Velocities    are consistent with < 50% stenosis of the internal carotid artery.    Plaque is irregular on the surface and heterogeneous with mixed acoustic    densities.     ________________________________________________________________________    This scalp EEG remains not remarkable    Of note, unremarkable  EEG does not completely exclude the diagnosis  of seizures since seizure is an episodic phenomena and frontal lobe seizures could have normal scalp EEG. Clinical correlation may help     If clinical suspicion of seizure remains high.  Prolonged outpatient EEG   monitoring may be of help.      ________________________________________________________________________      MRI of brain-- no strokes

## 2018-12-12 LAB
CHOLEST SERPL-MCNC: 102 MG/DL (ref 100–199)
HDLC SERPL-MCNC: 25 MG/DL
LABORATORY COMMENT REPORT: ABNORMAL
LDLC SERPL CALC-MCNC: ABNORMAL MG/DL (ref 0–99)
TRIGL SERPL-MCNC: 390 MG/DL (ref 0–149)
VLDLC SERPL CALC-MCNC: 78 MG/DL (ref 5–40)

## 2018-12-18 ENCOUNTER — TELEPHONE (OUTPATIENT)
Dept: NEUROLOGY | Facility: MEDICAL CENTER | Age: 58
End: 2018-12-18

## 2018-12-19 NOTE — TELEPHONE ENCOUNTER
Lipid profile improved a lot, but not perfect yet    Continue  Exercise  Eating healthy  Avoid alcohol  Continue Fish Oil       Results for JESSE DOAN (MRN [id number]) as of 12/18/2018 16:54   Ref. Range 5/9/2017 12:52 6/19/2017 08:49 1/18/2018 07:42 6/28/2018 08:37 12/11/2018 08:03   Cholesterol,Tot Latest Ref Range: 100 - 199 mg/dL 434 (H) 427 (H) 354 (H) 160 102   Triglycerides Latest Ref Range: 0 - 149 mg/dL 2,111 (HH) 2,507 (HH) 1,948 (HH) 550 (H) 390 (H)   HDL Latest Ref Range: >39 mg/dL 22 (L) 18 (L) 15 (L) 26 (L) 25 (L)

## 2018-12-21 ENCOUNTER — TELEPHONE (OUTPATIENT)
Dept: NEUROLOGY | Facility: MEDICAL CENTER | Age: 58
End: 2018-12-21

## 2019-03-22 ENCOUNTER — APPOINTMENT (OUTPATIENT)
Dept: NEUROLOGY | Facility: MEDICAL CENTER | Age: 59
End: 2019-03-22
Payer: MEDICARE